# Patient Record
Sex: FEMALE | Race: WHITE | NOT HISPANIC OR LATINO | URBAN - METROPOLITAN AREA
[De-identification: names, ages, dates, MRNs, and addresses within clinical notes are randomized per-mention and may not be internally consistent; named-entity substitution may affect disease eponyms.]

---

## 2017-08-02 ENCOUNTER — APPOINTMENT (RX ONLY)
Dept: URBAN - METROPOLITAN AREA CLINIC 57 | Facility: CLINIC | Age: 27
Setting detail: DERMATOLOGY
End: 2017-08-02

## 2017-08-02 DIAGNOSIS — D485 NEOPLASM OF UNCERTAIN BEHAVIOR OF SKIN: ICD-10-CM

## 2017-08-02 DIAGNOSIS — L72.0 EPIDERMAL CYST: ICD-10-CM

## 2017-08-02 DIAGNOSIS — D22 MELANOCYTIC NEVI: ICD-10-CM

## 2017-08-02 PROBLEM — D22.5 MELANOCYTIC NEVI OF TRUNK: Status: ACTIVE | Noted: 2017-08-02

## 2017-08-02 PROBLEM — D48.5 NEOPLASM OF UNCERTAIN BEHAVIOR OF SKIN: Status: ACTIVE | Noted: 2017-08-02

## 2017-08-02 PROCEDURE — 88305 TISSUE EXAM BY PATHOLOGIST: CPT | Mod: 26

## 2017-08-02 PROCEDURE — ? OBSERVATION AND MEASURE

## 2017-08-02 PROCEDURE — ? BIOPSY BY PUNCH METHOD

## 2017-08-02 PROCEDURE — ? COUNSELING

## 2017-08-02 PROCEDURE — ? PATHOLOGY BILLING

## 2017-08-02 PROCEDURE — 11100: CPT

## 2017-08-02 PROCEDURE — 99202 OFFICE O/P NEW SF 15 MIN: CPT | Mod: 25

## 2017-08-02 ASSESSMENT — LOCATION SIMPLE DESCRIPTION DERM
LOCATION SIMPLE: LEFT UPPER BACK
LOCATION SIMPLE: RIGHT INFERIOR EYELID
LOCATION SIMPLE: ABDOMEN

## 2017-08-02 ASSESSMENT — LOCATION DETAILED DESCRIPTION DERM
LOCATION DETAILED: LEFT MEDIAL UPPER BACK
LOCATION DETAILED: RIGHT LATERAL INFERIOR EYELID
LOCATION DETAILED: PERIUMBILICAL SKIN

## 2017-08-02 ASSESSMENT — LOCATION ZONE DERM
LOCATION ZONE: EYELID
LOCATION ZONE: TRUNK

## 2017-08-02 NOTE — PROCEDURE: BIOPSY BY PUNCH METHOD
Detail Level: Detailed
X Size Of Lesion In Cm (Optional): 0
Home Suture Removal Text: Patient was provided a home suture removal kit and will remove their sutures at home.  If they have any questions or difficulties they will call the office.
Body Location Override (Optional - Billing Will Still Be Based On Selected Body Map Location If Applicable): left upper abdomen
Patient Will Remove Sutures At Home?: No
Consent was obtained and risks were reviewed including but not limited to scarring, infection, bleeding, scabbing, incomplete removal, nerve damage and allergy to anesthesia.
Punch Size In Mm: 6
Epidermal Sutures: 4-0 Ethilon
Hemostasis: None
Biopsy Type: H and E
Dressing: pressure dressing
Billing Type: Third-Party Bill
Wound Care: Vaseline
Notification Instructions: Patient will be notified of biopsy results. However, patient instructed to call the office if not contacted within 2 weeks.
Post-Care Instructions: I reviewed with the patient in detail post-care instructions. Patient is to keep the biopsy site dry overnight, and then apply bacitracin twice daily until healed. Patient may apply hydrogen peroxide soaks to remove any crusting.
Anesthesia Volume In Cc: 0.5
Suture Removal: 7 days
Anesthesia Type: 1% lidocaine with epinephrine

## 2017-08-02 NOTE — PROCEDURE: PATHOLOGY BILLING
Cpt Code (87258, 99809 [place] [address]): 97061
Rendering Text In Billing: The biopsy specimens were grossed and processed into slides.
Rendering Text In Billing: The biopsy specimen was grossed and processed into a slide.
Number Of Specimens Per Diagnosis: 1
Rendering Text In Billing: The slides were read, and reported in an attached document.
Professional Component, Technical Component Or Both?: professional component
Detail Level: Detailed

## 2017-08-02 NOTE — PROCEDURE: OBSERVATION
Detail Level: Simple
Size Of Lesion: 1mm
Body Location Override (Optional - Billing Will Still Be Based On Selected Body Map Location If Applicable): right lower eyelid